# Patient Record
Sex: MALE | Race: WHITE
[De-identification: names, ages, dates, MRNs, and addresses within clinical notes are randomized per-mention and may not be internally consistent; named-entity substitution may affect disease eponyms.]

---

## 2017-01-27 ENCOUNTER — HOSPITAL ENCOUNTER (OUTPATIENT)
Dept: HOSPITAL 53 - M LAB | Age: 47
End: 2017-01-27
Attending: FAMILY MEDICINE
Payer: COMMERCIAL

## 2017-01-27 DIAGNOSIS — E78.2: Primary | ICD-10-CM

## 2017-01-27 LAB
CHOLEST SERPL-MCNC: 285 MG/DL (ref ?–200)
TRIGL SERPL-MCNC: 200 MG/DL (ref ?–150)

## 2017-05-06 ENCOUNTER — HOSPITAL ENCOUNTER (EMERGENCY)
Dept: HOSPITAL 53 - M ED | Age: 47
LOS: 1 days | Discharge: HOME | End: 2017-05-07
Payer: COMMERCIAL

## 2017-05-06 VITALS — HEIGHT: 74 IN | WEIGHT: 225 LBS | BODY MASS INDEX: 28.88 KG/M2

## 2017-05-06 DIAGNOSIS — Z79.899: ICD-10-CM

## 2017-05-06 DIAGNOSIS — I10: ICD-10-CM

## 2017-05-06 DIAGNOSIS — R07.89: Primary | ICD-10-CM

## 2017-05-06 LAB
ANION GAP SERPL CALC-SCNC: 8 MEQ/L (ref 8–16)
BASOPHILS # BLD AUTO: 0 K/MM3 (ref 0–0.2)
BASOPHILS NFR BLD AUTO: 0.7 % (ref 0–1)
BUN SERPL-MCNC: 17 MG/DL (ref 7–18)
CALCIUM SERPL-MCNC: 8.1 MG/DL (ref 8.5–10.1)
CHLORIDE SERPL-SCNC: 108 MEQ/L (ref 98–107)
CO2 SERPL-SCNC: 26 MEQ/L (ref 21–32)
CREAT SERPL-MCNC: 0.86 MG/DL (ref 0.7–1.3)
EOSINOPHIL # BLD AUTO: 0.2 K/MM3 (ref 0–0.5)
EOSINOPHIL NFR BLD AUTO: 4.5 % (ref 0–3)
ERYTHROCYTE [DISTWIDTH] IN BLOOD BY AUTOMATED COUNT: 13 % (ref 11.5–14.5)
GFR SERPL CREATININE-BSD FRML MDRD: > 60 ML/MIN/{1.73_M2} (ref 60–?)
GLUCOSE SERPL-MCNC: 99 MG/DL (ref 70–105)
LARGE UNSTAINED CELL #: 0.1 K/MM3 (ref 0–0.4)
LARGE UNSTAINED CELL %: 2.6 % (ref 0–4)
LYMPHOCYTES # BLD AUTO: 2 K/MM3 (ref 1.5–4.5)
LYMPHOCYTES NFR BLD AUTO: 34.6 % (ref 24–44)
MCH RBC QN AUTO: 30 PG (ref 27–33)
MCHC RBC AUTO-ENTMCNC: 33.4 G/DL (ref 32–36.5)
MCV RBC AUTO: 90 FL (ref 80–96)
MONOCYTES # BLD AUTO: 0.3 K/MM3 (ref 0–0.8)
MONOCYTES NFR BLD AUTO: 6 % (ref 0–5)
NEUTROPHILS # BLD AUTO: 2.8 K/MM3 (ref 1.8–7.7)
NEUTROPHILS NFR BLD AUTO: 51.6 % (ref 36–66)
PLATELET # BLD AUTO: 248 K/MM3 (ref 150–450)
POTASSIUM SERPL-SCNC: 4 MEQ/L (ref 3.5–5.1)
SODIUM SERPL-SCNC: 142 MEQ/L (ref 136–145)
WBC # BLD AUTO: 5.5 K/MM3 (ref 4–10)

## 2017-05-07 VITALS — DIASTOLIC BLOOD PRESSURE: 65 MMHG | SYSTOLIC BLOOD PRESSURE: 118 MMHG

## 2017-05-07 NOTE — ECGEPIP
Stationary ECG Study

                           [facility] - ED

                                       

                                       Test Date:    2017

Pat Name:     BRITTANY DOLAN           Department:   

Patient ID:   [id number]                 Room:         -

Gender:       M                        Technician:   

:          [id number]               Requested By: TARIQ RODRIGUEZ 

Order Number: [id number]     Reading MD:   Bibi Cartwright

                                 Measurements

Intervals                              Axis          

Rate:         56                       P:            9

PA:           181                      QRS:          18

QRSD:         96                       T:            15

QT:           408                                    

QTc:          395                                    

                           Interpretive Statements

SINUS BRADYCARDIA

DECREASED RATE 5/25/15

Electronically Signed On 2017 14:45:51 EDT by Bibi Cartwright

## 2017-05-07 NOTE — REP
CHEST, TWO VIEWS:

 

HISTORY: Chest pain.

 

COMPARISON: 05/25/2015

 

FINDINGS: The superior mediastinal structures are midline. The cardiac silhouette

is unremarkable in size, shape and position. The diaphragmatic surfaces of the

lungs are regular and the costophrenic angles are clear. The pulmonary fields are

clear. The imaged osseous structures are intact.

 

IMPRESSION:

 

There is no acute cardiopulmonary disease.

 

 

Signed by

Dejon Fry DO 05/07/2017 01:32 P

## 2018-11-06 ENCOUNTER — HOSPITAL ENCOUNTER (OUTPATIENT)
Dept: HOSPITAL 53 - M LAB | Age: 48
End: 2018-11-06
Attending: FAMILY MEDICINE
Payer: COMMERCIAL

## 2018-11-06 DIAGNOSIS — Z00.00: Primary | ICD-10-CM

## 2018-11-06 DIAGNOSIS — E55.9: ICD-10-CM

## 2018-11-06 DIAGNOSIS — M12.9: ICD-10-CM

## 2018-11-06 LAB
25(OH)D3 SERPL-MCNC: 27.5 NG/ML (ref 30–100)
ALBUMIN/GLOBULIN RATIO: 1.14 (ref 1–1.93)
ALBUMIN: 4 GM/DL (ref 3.2–5.2)
ALKALINE PHOSPHATASE: 101 U/L (ref 45–117)
ALT SERPL W P-5'-P-CCNC: 30 U/L (ref 12–78)
ANION GAP: 8 MEQ/L (ref 8–16)
AST SERPL-CCNC: 25 U/L (ref 7–37)
BILIRUBIN,TOTAL: 0.6 MG/DL (ref 0.2–1)
BLOOD UREA NITROGEN: 18 MG/DL (ref 7–18)
CALCIUM LEVEL: 9.2 MG/DL (ref 8.5–10.1)
CARBON DIOXIDE LEVEL: 26 MEQ/L (ref 21–32)
CHLORIDE LEVEL: 107 MEQ/L (ref 98–107)
CHOLEST SERPL-MCNC: 261 MG/DL (ref ?–200)
CHOLESTEROL RISK RATIO: 6.37 (ref ?–5)
CREATININE FOR GFR: 0.86 MG/DL (ref 0.7–1.3)
ERYTHROCYTE SEDIMENTATION RATE: 15 MM/HR (ref 0–15)
GFR SERPL CREATININE-BSD FRML MDRD: > 60 ML/MIN/{1.73_M2} (ref 60–?)
GLUCOSE, FASTING: 98 MG/DL (ref 70–100)
HDLC SERPL-MCNC: 41 MG/DL (ref 40–?)
LDL CHOLESTEROL: 187 MG/DL (ref ?–100)
NONHDLC SERPL-MCNC: 220 MG/DL
POTASSIUM SERUM: 4.6 MEQ/L (ref 3.5–5.1)
RHEUMATOID FACTOR QUANT: < 10 IU/ML (ref ?–15)
SODIUM LEVEL: 141 MEQ/L (ref 136–145)
THYROID STIMULATING HORMONE: 1.29 UIU/ML (ref 0.36–3.74)
TOTAL PROTEIN: 7.5 GM/DL (ref 6.4–8.2)
TRIGLYCERIDES LEVEL: 165 MG/DL (ref ?–150)

## 2018-11-06 PROCEDURE — 84443 ASSAY THYROID STIM HORMONE: CPT

## 2018-11-08 LAB
B BURGDOR IGG+IGM SER-ACNC: <0.91 ISR (ref 0–0.9)
B BURGDOR IGM SER IA-ACNC: <0.8 INDEX (ref 0–0.79)

## 2019-02-06 ENCOUNTER — HOSPITAL ENCOUNTER (OUTPATIENT)
Dept: HOSPITAL 53 - M WUC | Age: 49
End: 2019-02-06
Attending: PHYSICIAN ASSISTANT
Payer: COMMERCIAL

## 2019-02-06 DIAGNOSIS — J20.9: Primary | ICD-10-CM

## 2019-02-06 NOTE — REP
Chest two views

 

HISTORY: Bronchitis

 

Comparison: 05/06/2017

 

The lungs are clear.  The heart is normal in size.  The pulmonary vasculature is

normal in appearance.  The bony structure is intact.

 

IMPRESSION:  No acute disease.

 

 

Electronically Signed by

Roberto Diallo MD 02/06/2019 02:15 P